# Patient Record
Sex: MALE | Race: WHITE | NOT HISPANIC OR LATINO | Employment: FULL TIME | URBAN - METROPOLITAN AREA
[De-identification: names, ages, dates, MRNs, and addresses within clinical notes are randomized per-mention and may not be internally consistent; named-entity substitution may affect disease eponyms.]

---
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
## 2024-02-28 ENCOUNTER — HOSPITAL ENCOUNTER (EMERGENCY)
Facility: MEDICAL CENTER | Age: 24
End: 2024-02-28
Attending: EMERGENCY MEDICINE

## 2024-02-28 ENCOUNTER — APPOINTMENT (OUTPATIENT)
Dept: RADIOLOGY | Facility: MEDICAL CENTER | Age: 24
End: 2024-02-28
Attending: EMERGENCY MEDICINE

## 2024-02-28 VITALS
DIASTOLIC BLOOD PRESSURE: 51 MMHG | TEMPERATURE: 98.5 F | OXYGEN SATURATION: 97 % | HEIGHT: 72 IN | WEIGHT: 130 LBS | RESPIRATION RATE: 16 BRPM | SYSTOLIC BLOOD PRESSURE: 114 MMHG | BODY MASS INDEX: 17.61 KG/M2 | HEART RATE: 92 BPM

## 2024-02-28 DIAGNOSIS — R11.2 NAUSEA AND VOMITING, UNSPECIFIED VOMITING TYPE: ICD-10-CM

## 2024-02-28 DIAGNOSIS — Z77.29 CARBON MONOXIDE EXPOSURE: ICD-10-CM

## 2024-02-28 DIAGNOSIS — R42 VERTIGO: ICD-10-CM

## 2024-02-28 LAB
ALBUMIN SERPL BCP-MCNC: 4.2 G/DL (ref 3.2–4.9)
ALBUMIN/GLOB SERPL: 1.9 G/DL
ALP SERPL-CCNC: 55 U/L (ref 30–99)
ALT SERPL-CCNC: 27 U/L (ref 2–50)
ANION GAP SERPL CALC-SCNC: 10 MMOL/L (ref 7–16)
AST SERPL-CCNC: 25 U/L (ref 12–45)
BASOPHILS # BLD AUTO: 0.2 % (ref 0–1.8)
BASOPHILS # BLD: 0.03 K/UL (ref 0–0.12)
BILIRUB SERPL-MCNC: 0.5 MG/DL (ref 0.1–1.5)
BUN SERPL-MCNC: 9 MG/DL (ref 8–22)
CALCIUM ALBUM COR SERPL-MCNC: 8.1 MG/DL (ref 8.5–10.5)
CALCIUM SERPL-MCNC: 8.3 MG/DL (ref 8.4–10.2)
CHLORIDE SERPL-SCNC: 108 MMOL/L (ref 96–112)
CO2 SERPL-SCNC: 24 MMOL/L (ref 20–33)
COHGB MFR BLD: 1.4 % (ref 0–4.9)
CREAT SERPL-MCNC: 0.79 MG/DL (ref 0.5–1.4)
EOSINOPHIL # BLD AUTO: 0.01 K/UL (ref 0–0.51)
EOSINOPHIL NFR BLD: 0.1 % (ref 0–6.9)
ERYTHROCYTE [DISTWIDTH] IN BLOOD BY AUTOMATED COUNT: 41.4 FL (ref 35.9–50)
GFR SERPLBLD CREATININE-BSD FMLA CKD-EPI: 127 ML/MIN/1.73 M 2
GLOBULIN SER CALC-MCNC: 2.2 G/DL (ref 1.9–3.5)
GLUCOSE SERPL-MCNC: 89 MG/DL (ref 65–99)
HCT VFR BLD AUTO: 44.8 % (ref 42–52)
HGB BLD-MCNC: 15.2 G/DL (ref 14–18)
IMM GRANULOCYTES # BLD AUTO: 0.05 K/UL (ref 0–0.11)
IMM GRANULOCYTES NFR BLD AUTO: 0.4 % (ref 0–0.9)
LYMPHOCYTES # BLD AUTO: 0.65 K/UL (ref 1–4.8)
LYMPHOCYTES NFR BLD: 5.1 % (ref 22–41)
MCH RBC QN AUTO: 32 PG (ref 27–33)
MCHC RBC AUTO-ENTMCNC: 33.9 G/DL (ref 32.3–36.5)
MCV RBC AUTO: 94.3 FL (ref 81.4–97.8)
MONOCYTES # BLD AUTO: 0.46 K/UL (ref 0–0.85)
MONOCYTES NFR BLD AUTO: 3.6 % (ref 0–13.4)
NEUTROPHILS # BLD AUTO: 11.44 K/UL (ref 1.82–7.42)
NEUTROPHILS NFR BLD: 90.6 % (ref 44–72)
NRBC # BLD AUTO: 0 K/UL
NRBC BLD-RTO: 0 /100 WBC (ref 0–0.2)
PLATELET # BLD AUTO: 195 K/UL (ref 164–446)
PMV BLD AUTO: 9.1 FL (ref 9–12.9)
POTASSIUM SERPL-SCNC: 4.3 MMOL/L (ref 3.6–5.5)
PROT SERPL-MCNC: 6.4 G/DL (ref 6–8.2)
RBC # BLD AUTO: 4.75 M/UL (ref 4.7–6.1)
SODIUM SERPL-SCNC: 142 MMOL/L (ref 135–145)
WBC # BLD AUTO: 12.6 K/UL (ref 4.8–10.8)

## 2024-02-28 PROCEDURE — 700111 HCHG RX REV CODE 636 W/ 250 OVERRIDE (IP): Mod: JZ | Performed by: EMERGENCY MEDICINE

## 2024-02-28 PROCEDURE — 36415 COLL VENOUS BLD VENIPUNCTURE: CPT

## 2024-02-28 PROCEDURE — 700102 HCHG RX REV CODE 250 W/ 637 OVERRIDE(OP): Performed by: EMERGENCY MEDICINE

## 2024-02-28 PROCEDURE — 80053 COMPREHEN METABOLIC PANEL: CPT

## 2024-02-28 PROCEDURE — A9270 NON-COVERED ITEM OR SERVICE: HCPCS | Performed by: EMERGENCY MEDICINE

## 2024-02-28 PROCEDURE — 85025 COMPLETE CBC W/AUTO DIFF WBC: CPT

## 2024-02-28 PROCEDURE — 82375 ASSAY CARBOXYHB QUANT: CPT

## 2024-02-28 PROCEDURE — 70450 CT HEAD/BRAIN W/O DYE: CPT

## 2024-02-28 PROCEDURE — 99284 EMERGENCY DEPT VISIT MOD MDM: CPT

## 2024-02-28 PROCEDURE — 96374 THER/PROPH/DIAG INJ IV PUSH: CPT

## 2024-02-28 RX ORDER — DIAZEPAM 5 MG/1
5 TABLET ORAL EVERY 6 HOURS PRN
Qty: 20 TABLET | Refills: 0 | Status: SHIPPED | OUTPATIENT
Start: 2024-02-28 | End: 2024-03-04

## 2024-02-28 RX ORDER — DIAZEPAM 5 MG/ML
5 INJECTION, SOLUTION INTRAMUSCULAR; INTRAVENOUS ONCE
Status: COMPLETED | OUTPATIENT
Start: 2024-02-28 | End: 2024-02-28

## 2024-02-28 RX ORDER — ONDANSETRON 4 MG/1
4 TABLET, ORALLY DISINTEGRATING ORAL EVERY 8 HOURS PRN
Qty: 10 TABLET | Refills: 1 | Status: SHIPPED | OUTPATIENT
Start: 2024-02-28

## 2024-02-28 RX ORDER — DIAZEPAM 5 MG/1
5 TABLET ORAL ONCE
Status: COMPLETED | OUTPATIENT
Start: 2024-02-28 | End: 2024-02-28

## 2024-02-28 RX ADMIN — DIAZEPAM 5 MG: 5 INJECTION, SOLUTION INTRAMUSCULAR; INTRAVENOUS at 15:45

## 2024-02-28 RX ADMIN — DIAZEPAM 5 MG: 5 TABLET ORAL at 18:29

## 2024-02-28 NOTE — ED NOTES
Pt mother called to inform staff pt house had gas leak at the water heater. Unsure if contributing to his symptoms. MD notified.

## 2024-02-28 NOTE — ED TRIAGE NOTES
Chief Complaint   Patient presents with    Dizziness     States he was recently dx with Vertigo on 2/26/24 feels dizzy and nauseous, medication not working.       /73   Pulse 80   Temp 36.9 °C (98.5 °F) (Temporal)   Resp 16   Ht 1.829 m (6')   Wt 59 kg (130 lb)   SpO2 99%   BMI 17.63 kg/m²

## 2024-02-28 NOTE — ED PROVIDER NOTES
ED Provider Note    CHIEF COMPLAINT  Chief Complaint   Patient presents with    Dizziness     States he was recently dx with Vertigo on 2/26/24 feels dizzy and nauseous, medication not working.         EXTERNAL RECORDS REVIEWED  External ED Note from Ethan Kendall February 26 2 days ago, seen for vertigo, diagnosed with peripheral vertigo and prescribed meclizine    HPI/ROS  LIMITATION TO HISTORY   Select: : None  OUTSIDE HISTORIAN(S):  Family at bedside for discussion history and symptoms    Bubba Shahid is a 23 y.o. male who presents with vertigo.  Onset Monday, gradually worsening.  Seen at urgent care 2 days ago, meclizine has not been helping his vertigo today.  He holds his head slightly tilted to the left and still which he states is a position of comfort.  No diplopia, no tinnitus.  He denies recent trauma, no viral symptoms.  He denies previous episodes of vertigo.  He awoke Monday morning feeling well, while at work, dizziness developed.  A gas leak has been discovered at his home from his water heater.  He had a headache yesterday, no headache today.  No acute numbness or weakness to extremities.    PAST MEDICAL HISTORY   Recently diagnosed with peripheral vertigo    SURGICAL HISTORY   has a past surgical history that includes other orthopedic surgery.    FAMILY HISTORY  No family history on file.    SOCIAL HISTORY  Social History     Tobacco Use    Smoking status: Never    Smokeless tobacco: Never   Vaping Use    Vaping Use: Never used   Substance and Sexual Activity    Alcohol use: Not Currently    Drug use: Not Currently    Sexual activity: Not on file       CURRENT MEDICATIONS  Home Medications    **Home medications have not yet been reviewed for this encounter**         ALLERGIES  Allergies   Allergen Reactions    Rocephin [Ceftriaxone] Shortness of Breath, Rash and Palpitations     Chest pain, SOB and rash    Invanz [Ertapenem] Rash     Red rash across torso        PHYSICAL EXAM  VITAL SIGNS: BP  116/73   Pulse 80   Temp 36.9 °C (98.5 °F) (Temporal)   Resp 16   Ht 1.829 m (6')   Wt 59 kg (130 lb)   SpO2 99%   BMI 17.63 kg/m²    Eyes: Pupils are equal.  Extraocular motions intact.  There is lateral nystagmus with lateral gaze  ENT: No facial droop, no epistaxis  Musculo skeletal: Neck is supple  GI: No distention  Neurologic: Sensation normal, strength normal, speech clear  Cardiac: Regular rate and rhythm  Respiratory: No distress    DIAGNOSTIC STUDIES / PROCEDURES      LABS  Results for orders placed or performed during the hospital encounter of 02/28/24   CBC WITH DIFFERENTIAL   Result Value Ref Range    WBC 12.6 (H) 4.8 - 10.8 K/uL    RBC 4.75 4.70 - 6.10 M/uL    Hemoglobin 15.2 14.0 - 18.0 g/dL    Hematocrit 44.8 42.0 - 52.0 %    MCV 94.3 81.4 - 97.8 fL    MCH 32.0 27.0 - 33.0 pg    MCHC 33.9 32.3 - 36.5 g/dL    RDW 41.4 35.9 - 50.0 fL    Platelet Count 195 164 - 446 K/uL    MPV 9.1 9.0 - 12.9 fL    Neutrophils-Polys 90.60 (H) 44.00 - 72.00 %    Lymphocytes 5.10 (L) 22.00 - 41.00 %    Monocytes 3.60 0.00 - 13.40 %    Eosinophils 0.10 0.00 - 6.90 %    Basophils 0.20 0.00 - 1.80 %    Immature Granulocytes 0.40 0.00 - 0.90 %    Nucleated RBC 0.00 0.00 - 0.20 /100 WBC    Neutrophils (Absolute) 11.44 (H) 1.82 - 7.42 K/uL    Lymphs (Absolute) 0.65 (L) 1.00 - 4.80 K/uL    Monos (Absolute) 0.46 0.00 - 0.85 K/uL    Eos (Absolute) 0.01 0.00 - 0.51 K/uL    Baso (Absolute) 0.03 0.00 - 0.12 K/uL    Immature Granulocytes (abs) 0.05 0.00 - 0.11 K/uL    NRBC (Absolute) 0.00 K/uL   COMP METABOLIC PANEL   Result Value Ref Range    Sodium 142 135 - 145 mmol/L    Potassium 4.3 3.6 - 5.5 mmol/L    Chloride 108 96 - 112 mmol/L    Co2 24 20 - 33 mmol/L    Anion Gap 10.0 7.0 - 16.0    Glucose 89 65 - 99 mg/dL    Bun 9 8 - 22 mg/dL    Creatinine 0.79 0.50 - 1.40 mg/dL    Calcium 8.3 (L) 8.4 - 10.2 mg/dL    Correct Calcium 8.1 (L) 8.5 - 10.5 mg/dL    AST(SGOT) 25 12 - 45 U/L    ALT(SGPT) 27 2 - 50 U/L    Alkaline  Phosphatase 55 30 - 99 U/L    Total Bilirubin 0.5 0.1 - 1.5 mg/dL    Albumin 4.2 3.2 - 4.9 g/dL    Total Protein 6.4 6.0 - 8.2 g/dL    Globulin 2.2 1.9 - 3.5 g/dL    A-G Ratio 1.9 g/dL   CARBOXYHEMOGLOBIN   Result Value Ref Range    Carbon Monoxide-Co 1.4 0.0 - 4.9 %   ESTIMATED GFR   Result Value Ref Range    GFR (CKD-EPI) 127 >60 mL/min/1.73 m 2         RADIOLOGY  I have independently interpreted the diagnostic imaging associated with this visit and am waiting the final reading from the radiologist.   My preliminary interpretation is as follows: CT scan of the head is negative for acute hemorrhage  Radiologist interpretation:   CT-HEAD W/O   Final Result      No acute intracranial abnormality.               COURSE & MEDICAL DECISION MAKING    ED Observation Status? No; Patient does not meet criteria for ED Observation.     INITIAL ASSESSMENT, COURSE AND PLAN  Care Narrative: Patient presents with vertigo, appears to be peripheral.  At rest no nystagmus, symptoms improved.  With movement of the head, lateral gaze, there is lateral nystagmus and increased vertigo-like symptoms.  Strength sensation is good.  Speech is normal.  No diplopia or visual loss.  Suspect viral lab otitis.  Patient does have elevated white blood cell count, likely secondary to vomiting and demargination although possibly in response to infection.  No evidence of meningitis, neck is supple, no headache.  CT scan was obtained due to persistent symptoms, negative for intracranial hemorrhage, no mass or hydrocephalus.  Patient felt much improved after 5 mg dose of Valium.  He was given second dose prior to discharge home.  Patient is prescribed Valium to be used if needed, he already has meclizine.  He is advised ear nose throat coverage is not available at this hospital, if he does not improve by next week should follow-up with ear nose throat physician via referral from primary care doctor if needed.  For worsening headache, fever, numbness or  weakness, worsening vertigo and uncontrollable symptoms, I have advised him to go to [facility] emergency department where increased especially coverage is available, 24-hour MRI if needed        DISPOSITION AND DISCUSSIONS    Escalation of care considered, and ultimately not performed:diagnostic imaging, MRI    Barriers to care at this time, including but not limited to:  None .     Decision tools and prescription drugs considered including, but not limited to: Antibiotics were not indicated, no evidence of bacterial infection .    FINAL DIAGNOSIS  1. Vertigo    2. Nausea and vomiting, unspecified vomiting type    3. Carbon monoxide exposure           Electronically signed by: Fredy Garcia M.D., 2/28/2024 3:36 PM

## 2024-02-29 NOTE — DISCHARGE INSTRUCTIONS
See a doctor for recheck if not better by Monday.  Consider following up with ear nose throat physician for ongoing symptoms of vertigo.  If worse go to [facility] emergency department

## 2024-02-29 NOTE — ED NOTES
Dc instructions and medications discussed with patient at bedside. All questions answered at this time. VSS. No IV in place at this time. Pt to lobby without incident. Family to drive patient home.